# Patient Record
Sex: FEMALE | Race: WHITE | NOT HISPANIC OR LATINO
[De-identification: names, ages, dates, MRNs, and addresses within clinical notes are randomized per-mention and may not be internally consistent; named-entity substitution may affect disease eponyms.]

---

## 2023-12-20 PROBLEM — Z00.00 ENCOUNTER FOR PREVENTIVE HEALTH EXAMINATION: Status: ACTIVE | Noted: 2023-12-20

## 2023-12-22 ENCOUNTER — APPOINTMENT (OUTPATIENT)
Dept: GYNECOLOGIC ONCOLOGY | Facility: CLINIC | Age: 43
End: 2023-12-22
Payer: COMMERCIAL

## 2023-12-22 VITALS
DIASTOLIC BLOOD PRESSURE: 83 MMHG | HEIGHT: 60 IN | SYSTOLIC BLOOD PRESSURE: 134 MMHG | WEIGHT: 218 LBS | HEART RATE: 82 BPM | BODY MASS INDEX: 42.8 KG/M2

## 2023-12-22 DIAGNOSIS — N83.209 UNSPECIFIED OVARIAN CYST, UNSPECIFIED SIDE: ICD-10-CM

## 2023-12-22 DIAGNOSIS — Z80.1 FAMILY HISTORY OF MALIGNANT NEOPLASM OF TRACHEA, BRONCHUS AND LUNG: ICD-10-CM

## 2023-12-22 DIAGNOSIS — Z87.891 PERSONAL HISTORY OF NICOTINE DEPENDENCE: ICD-10-CM

## 2023-12-22 DIAGNOSIS — Z80.0 FAMILY HISTORY OF MALIGNANT NEOPLASM OF DIGESTIVE ORGANS: ICD-10-CM

## 2023-12-22 PROCEDURE — 99205 OFFICE O/P NEW HI 60 MIN: CPT

## 2023-12-22 RX ORDER — UBROGEPANT 100 MG/1
100 TABLET ORAL
Refills: 0 | Status: ACTIVE | COMMUNITY

## 2023-12-22 RX ORDER — BACILLUS COAGULANS/INULIN 1B-250 MG
CAPSULE ORAL
Refills: 0 | Status: ACTIVE | COMMUNITY

## 2023-12-22 RX ORDER — PNV NO.95/FERROUS FUM/FOLIC AC 28MG-0.8MG
TABLET ORAL
Refills: 0 | Status: ACTIVE | COMMUNITY

## 2023-12-22 RX ORDER — DIPHENHYDRAMINE HYDROCHLORIDE, ZINC ACETATE 20; 1 MG/G; MG/G
CREAM TOPICAL
Refills: 0 | Status: ACTIVE | COMMUNITY

## 2023-12-22 RX ORDER — BIOTIN 10 MG
TABLET ORAL
Refills: 0 | Status: ACTIVE | COMMUNITY

## 2023-12-22 RX ORDER — ERENUMAB-AOOE 70 MG/ML
INJECTION SUBCUTANEOUS
Refills: 0 | Status: ACTIVE | COMMUNITY

## 2023-12-22 RX ORDER — PSYLLIUM HUSK 0.4 G
CAPSULE ORAL
Refills: 0 | Status: ACTIVE | COMMUNITY

## 2023-12-22 NOTE — PHYSICAL EXAM
[Chaperone Present] : A chaperone was present in the examining room during all aspects of the physical examination [Soft, Nontender] : the abdomen was soft and nontender [Abnormal] : Bimanual Exam: Abnormal [Normal] : Recto-Vaginal Exam: Normal [Fully active, able to carry on all pre-disease performance without restriction] : Status 0 - Fully active, able to carry on all pre-disease performance without restriction [FreeTextEntry1] : Iveth [Firm] : not firm [Guarding] : no guarding

## 2023-12-22 NOTE — DISCUSSION/SUMMARY
[Reviewed Clinical Lab Test(s)] : Results of clinical tests were reviewed. [Reviewed Radiology Report(s)] : Radiology reports were reviewed. [Reviewed Radiology Film/Image(s)] : Images from radiology studies were reviewed and examined. [Discuss Tests w/Referring Providers] : Results of labs/radiology studies and the treatment recommendations were discussed with performing/referring physician. [Discuss Alternatives/Risks/Benefits w/Patient] : All alternatives, risks, and benefits were discussed with the patient/family and all questions were answered.  Patient expressed good understanding and appreciates the importance of follow up as recommended. [Visit Time ___ Minutes] : [unfilled] minutes [Face to Face Time___ Minutes] : with [unfilled] minutes in face to face consultation. [Pre Op] : The differential diagnosis was discussed in detail. The indications, risks, benefits and alternatives were discussed. [unfilled] expressed an understanding of the treatment rationale and her questions were answered to her apparent satisfaction. [FreeTextEntry1] :  We had an extensive discussion today about management of ovarian cysts in premenopausal women. We reviewed the differential of ovarian cysts in this setting:    Benign ovarian cysts  Ovarian cysts of borderline malignant potential  Malignant ovarian cysts (ovarian cancer)  Benign non-ovarian processes (fallopian tube cysts, fibroids, scar tissue)  Malignant non-ovarian processes (adjacent organ or metastasis from another primary site)   There are several factors that impact the risk of malignancy including the size of the cyst, presence or absence of septations, solid areas, increased vascularity, surface nodularity, or ascites. Elevated tumor markers would also be of concern.   I discussed low suspicion of malignancy due to the appearance of the mass, her age and normal markers, but that a mass of this size will persist continue to cause symptoms and should be removed surgically.  Offered patient USO with frozen section and possible staging  We discussed open vs MIS approach.  I discussed possibility of draining the cyst, followed by MIS approach to removal vs laparotomy. Discussed the advantage of laparotomy being the likely removal of the mass intact without spillage which would not be possible with the MIS approach. She prefers to drain the cyst and understands the risks in the unlikely event a malignancy is present.   Once the surgical specimen is removed, it will be sent for frozen section by pathology while the patient is still asleep. If the mass is benign, no further surgery would be required. If a malignancy were identified, further intraoperative procedures would be dependent upon what is identified and the patient's tolerance of the procedure and clinical status. High grade tumors typically require complete staging including BSO, hysterectomy, omentectomy, and pelvic/para-aortic lymphadenectomy. Surgical staging is an important part of management for some cancers as it can help determine whether the patient will benefit from postoperative chemotherapy.  There are limitations associated with frozen pathology in that it only samples a very small amount of the specimen and cancer can be missed. However, it is not possible or feasible to do more sampling at the time of surgery and this is our best attempt to avoid the need for a second surgery but sometimes this is not possible.  The patient would like a hysterectomy regardless of pathology of adnexal mass. She states that she has been wanting this for a long time. Does not like IUD even though it seems to be working for her right now, she would not want another one placed in the future. We discussed she is somewhere around 5 years away from menopause which may resolve her symptoms related to the AUB and fibroids and she also expressed concern about HPV and possible cervical or uterine malignancy in the future. I discussed that this is unpredictable but that hysterectomy does add additional complexity and risk to the surgery as well as slightly increased recovery time. She still desires to proceed with hysterectomy.   We have discussed the risks of the planned procedure at length as well as the benefits and alternatives.   We discussed risk of bleeding and the possibility of blood transfusions, which the patient is amenable to in the event that it is needed.   We have discussed the possibility of infection, including superficial infection of the wound, the underlying tissues or deep infection in the abdomen or pelvis, as well as urinary tract infection and pneumonia.  We discussed the steps taken to mitigate that risk; but the possibility of need for antibiotics, a prolonged hospital stay, hospital readmission, or ICU stay remain.  We discussed the individual risk of infection is different for each patient and each procedure.   We have discussed risk of damage to surrounding structures, including the large and small bowel, bladder and ureters, blood vessels, and nerves. We discussed that we attempt to identify and fix any injury at the time of surgery, but that this may involve a more extensive operation that originally planned, the possible involvement of additional surgical subspecialists and a longer hospital stay than is typical.  We discussed that again, each individual patient and procedure incurs an unequal level of risk of possible complication during surgery.   There is an increased risk of VTE and PE related to surgery and hospitalization especially in the setting of a cancer diagnosis. We discussed administration of heparin pre-operatively as well as during hospitalization if necessary to prevent formation of blood clots.    We have discussed the post operative expectations for the surgery as planned, but deviations from the surgical plan may be required in order to achieve the surgical goals. This may necessitate additional post operative care, in-hospital or at home as determined by the situation. We discussed possible removal of abnormal appearing tissue in the abdominal cavity if it is seen and appendectomy if abnormal appearing. We discussed that with any minimally invasive or laparoscopic/robotic procedure there is a low, but not zero risk of conversion to an open surgery.   We discussed that there may be medical students, residents and fellows present during her procedure and participating in her post-operative care under my direct supervision. We discussed the importance of individuals participating in the procedure, including myself and assistants, to perform exam under anesthesia.   Plan for surgery RA ZHENG BS USO possible BSO staging  PST   Erika Christian MD, FACOG  Gynecologic Oncology

## 2023-12-22 NOTE — REVIEW OF SYSTEMS
[Negative] : Respiratory [Hematuria] : no hematuria [Vaginal Discharge] : no vaginal discharge [Dyspareunia] : no dyspareunia [Normal Sexual Function] : abnormal sexual function [Chest Pain] : no chest pain [SOB on Exertion] : no shortness of breath during exertion [Diarrhea] : no diarrhea [Nausea] : no nausea/vomitting [de-identified] : Abdominal pain [de-identified] : Back pain

## 2023-12-22 NOTE — HISTORY OF PRESENT ILLNESS
[FreeTextEntry1] : 44 yo with known large adnexal cyst.  She states that the cyst has been present for at least 6 months. States that she had new IUD placed 6/2023 and US was done which showed cyst but noone ever told her about this until she was seen in the ED earlier this month and she was told that it had not changed in size. Which prompted her to get records of the prior US report.  She reports pelvic pain that is worse with activity. She also notes positional feelings of abdominal pressure or pelvic heaviness. Also notes weight gain over 6mo-1yr time. States that she feels very bloated all of the time. Does not have bleeding regularly due to IUD. Also with sciatic back pain.   She states that before IUD she had very painful menses with very heavy bleeding that was very irregular. She also states that she has been asking for a hysterectomy for many years due to pain, fibroids and concern for prior HPV infection and development of cervical or endometrial malignancy in the future.    The patient denies CP, SOB, abdominal pain, distension, constipation, diarrhea, urinary symptoms, vaginal bleeding, discharge, extremity swelling, skin changes. She denies problems with sleep, depression or anxiety.  HCM: Pap: 4/2023? Mammo:2023 wnl per pt C-scope: 2023, polyps removed, diverticulosis PMH: Migraine  PSH: Tonsilectomy Gyn Hx: Hx of cysts and fibroids as above, Hx of AUB in past Ob Hx: G0 Meds: Amovig, Ubrelvy, MVI, Probiotic, Fiber Allergies: LATEX, swelling, PCN- adverse reaction yeast infection  FH: Grandmother, lung 68, Mother colon 6, Grandmother paternal breast 60 SH: Former smoker 10 pack years, quit ~20 years ago, no etoh, occ marijuana Works as emergency response agent  Lives mostly in [place], aunt lives here, she goes back and forth  12/9/23 : 8.5 CEA: 0.7 : 7.0  CTAP 12/9/23 Report  18.0x 13.0x 13.3cm L ovarian cyst, no solid components or septations Ut 9.ox 3.4x 6.5, 2.3cm fibroid   TVUS 12/9/23 [facility] Report Left Unilocular cyst 17.4x 14.7x 16.4cm Region of wall thickening with vascular flow ORADS 4  TVUS 6/20/23 L ovarian cyst 18.7x 15.7x 11.6 R ov cyst 3cm

## 2024-01-16 ENCOUNTER — OUTPATIENT (OUTPATIENT)
Dept: OUTPATIENT SERVICES | Facility: HOSPITAL | Age: 44
LOS: 1 days | End: 2024-01-16

## 2024-01-16 VITALS
HEART RATE: 68 BPM | DIASTOLIC BLOOD PRESSURE: 82 MMHG | OXYGEN SATURATION: 99 % | SYSTOLIC BLOOD PRESSURE: 130 MMHG | WEIGHT: 216.93 LBS | TEMPERATURE: 98 F | RESPIRATION RATE: 16 BRPM | HEIGHT: 60 IN

## 2024-01-16 DIAGNOSIS — N83.209 UNSPECIFIED OVARIAN CYST, UNSPECIFIED SIDE: ICD-10-CM

## 2024-01-16 DIAGNOSIS — Z90.89 ACQUIRED ABSENCE OF OTHER ORGANS: Chronic | ICD-10-CM

## 2024-01-16 DIAGNOSIS — E66.01 MORBID (SEVERE) OBESITY DUE TO EXCESS CALORIES: ICD-10-CM

## 2024-01-16 LAB
A1C WITH ESTIMATED AVERAGE GLUCOSE RESULT: 5.2 % — SIGNIFICANT CHANGE UP (ref 4–5.6)
ANION GAP SERPL CALC-SCNC: 9 MMOL/L — SIGNIFICANT CHANGE UP (ref 7–14)
BLD GP AB SCN SERPL QL: NEGATIVE — SIGNIFICANT CHANGE UP
BUN SERPL-MCNC: 8 MG/DL — SIGNIFICANT CHANGE UP (ref 7–23)
CALCIUM SERPL-MCNC: 8.7 MG/DL — SIGNIFICANT CHANGE UP (ref 8.4–10.5)
CHLORIDE SERPL-SCNC: 101 MMOL/L — SIGNIFICANT CHANGE UP (ref 98–107)
CO2 SERPL-SCNC: 28 MMOL/L — SIGNIFICANT CHANGE UP (ref 22–31)
CREAT SERPL-MCNC: 0.71 MG/DL — SIGNIFICANT CHANGE UP (ref 0.5–1.3)
EGFR: 107 ML/MIN/1.73M2 — SIGNIFICANT CHANGE UP
ESTIMATED AVERAGE GLUCOSE: 103 — SIGNIFICANT CHANGE UP
GLUCOSE SERPL-MCNC: 90 MG/DL — SIGNIFICANT CHANGE UP (ref 70–99)
HCG UR QL: NEGATIVE — SIGNIFICANT CHANGE UP
HCT VFR BLD CALC: 39.2 % — SIGNIFICANT CHANGE UP (ref 34.5–45)
HGB BLD-MCNC: 12.7 G/DL — SIGNIFICANT CHANGE UP (ref 11.5–15.5)
MCHC RBC-ENTMCNC: 28 PG — SIGNIFICANT CHANGE UP (ref 27–34)
MCHC RBC-ENTMCNC: 32.4 GM/DL — SIGNIFICANT CHANGE UP (ref 32–36)
MCV RBC AUTO: 86.3 FL — SIGNIFICANT CHANGE UP (ref 80–100)
NRBC # BLD: 0 /100 WBCS — SIGNIFICANT CHANGE UP (ref 0–0)
NRBC # FLD: 0 K/UL — SIGNIFICANT CHANGE UP (ref 0–0)
PLATELET # BLD AUTO: 212 K/UL — SIGNIFICANT CHANGE UP (ref 150–400)
POTASSIUM SERPL-MCNC: 3.6 MMOL/L — SIGNIFICANT CHANGE UP (ref 3.5–5.3)
POTASSIUM SERPL-SCNC: 3.6 MMOL/L — SIGNIFICANT CHANGE UP (ref 3.5–5.3)
RBC # BLD: 4.54 M/UL — SIGNIFICANT CHANGE UP (ref 3.8–5.2)
RBC # FLD: 13.3 % — SIGNIFICANT CHANGE UP (ref 10.3–14.5)
RH IG SCN BLD-IMP: POSITIVE — SIGNIFICANT CHANGE UP
RH IG SCN BLD-IMP: POSITIVE — SIGNIFICANT CHANGE UP
SODIUM SERPL-SCNC: 138 MMOL/L — SIGNIFICANT CHANGE UP (ref 135–145)
WBC # BLD: 7.25 K/UL — SIGNIFICANT CHANGE UP (ref 3.8–10.5)
WBC # FLD AUTO: 7.25 K/UL — SIGNIFICANT CHANGE UP (ref 3.8–10.5)

## 2024-01-16 RX ORDER — ERENUMAB-AOOE 70 MG/ML
140 INJECTION SUBCUTANEOUS
Refills: 0 | DISCHARGE

## 2024-01-16 RX ORDER — SODIUM CHLORIDE 9 MG/ML
1000 INJECTION, SOLUTION INTRAVENOUS
Refills: 0 | Status: DISCONTINUED | OUTPATIENT
Start: 2024-01-23 | End: 2024-01-23

## 2024-01-16 RX ORDER — CHLORHEXIDINE GLUCONATE 213 G/1000ML
1 SOLUTION TOPICAL DAILY
Refills: 0 | Status: DISCONTINUED | OUTPATIENT
Start: 2024-01-23 | End: 2024-01-24

## 2024-01-16 NOTE — H&P PST ADULT - ATTENDING COMMENTS
R/B/A discussed at length during consultation and reiterated this morning in pre-op.  Proceed with case as scheduled.  RA TLH BS Unilateral Oophorectomy, cystoscopy, possible open, possible staging if cancer with removal of contralateral ovary, lymph nodes, omentum, abnormal tissue    Erika Christian MD, FACOG  Gynecologic Oncology

## 2024-01-16 NOTE — H&P PST ADULT - NEGATIVE FEMALE-SPECIFIC SYMPTOMS
Solaraze Counseling:  I discussed with the patient the risks of Solaraze including but not limited to erythema, scaling, itching, weeping, crusting, and pain. no irregular menses

## 2024-01-16 NOTE — H&P PST ADULT - ASSESSMENT
The patient is a 65y year old Female complaining of chest pressure. 44 y/o female with hx of ovarian cyst (L) x 6 months.  Pt reports pelvic pain that is worse with activity, and menstruation   Scheduled for Robotic Assisted Hysterectomy unilateral oophorectomy 42 y/o female with hx of ovarian cyst (L) x 6 months.  Pt reports pelvic pain that is worse with activity, and menstruation   Scheduled for Robotic Assisted Hysterectomy unilateral oophorectomy, salpingectomy, possible staging

## 2024-01-16 NOTE — H&P PST ADULT - GENITOURINARY COMMENTS
hx of ovarian cyst (L) x 6 months.  Pt reports pelvic pain that is worse with activity, and menstruation   Scheduled for Robotic Assisted Hysterectomy unilateral oophorectomy not examined

## 2024-01-16 NOTE — H&P PST ADULT - HISTORY OF PRESENT ILLNESS
42 y/o female with hx of ovarian cyst (L) x 6 months.  Pt reports pelvic pain that is worse with activity, and menstruation   Scheduled for Robotic Assisted Hysterectomy unilateral oophorectomy 42 y/o female with hx of ovarian cyst (L) x 6 months.  Pt reports pelvic pain that is worse with activity, and menstruation   Scheduled for Robotic Assisted Hysterectomy unilateral oophorectomy, salpingectomy, possible staging

## 2024-01-16 NOTE — H&P PST ADULT - PROBLEM SELECTOR PLAN 1
Robotic Assisted Hysterectomy unilateral oophorectomy 1/23/24    CBC BMP UCG T&S HgbA1C ABO     Preop instructions and antibacterial soap given and explained (verbal and written), with teach back. Robotic Assisted Hysterectomy unilateral oophorectomy, salpingectomy, possible staging 1/23/24    CBC BMP UCG T&S HgbA1C ABO     Preop instructions and antibacterial soap given and explained (verbal and written), with teach back.

## 2024-01-16 NOTE — H&P PST ADULT - NSICDXPASTMEDICALHX_GEN_ALL_CORE_FT
PAST MEDICAL HISTORY:  Colon polyps     Cyst, ovarian     Diverticulosis     Obesity      PAST MEDICAL HISTORY:  Colon polyps     Cyst, ovarian     Diverticulosis     History of migraine headaches     Obesity

## 2024-01-22 ENCOUNTER — TRANSCRIPTION ENCOUNTER (OUTPATIENT)
Age: 44
End: 2024-01-22

## 2024-01-22 NOTE — ASU PATIENT PROFILE, ADULT - FALL HARM RISK - UNIVERSAL INTERVENTIONS
Bed in lowest position, wheels locked, appropriate side rails in place/Call bell, personal items and telephone in reach/Instruct patient to call for assistance before getting out of bed or chair/Non-slip footwear when patient is out of bed/[place] to call system/Physically safe environment - no spills, clutter or unnecessary equipment/Purposeful Proactive Rounding/Room/bathroom lighting operational, light cord in reach

## 2024-01-23 ENCOUNTER — INPATIENT (INPATIENT)
Facility: HOSPITAL | Age: 44
LOS: 0 days | Discharge: HOME | End: 2024-01-24
Attending: GENERAL ACUTE CARE HOSPITAL | Admitting: GENERAL ACUTE CARE HOSPITAL
Payer: COMMERCIAL

## 2024-01-23 ENCOUNTER — RESULT REVIEW (OUTPATIENT)
Age: 44
End: 2024-01-23

## 2024-01-23 ENCOUNTER — APPOINTMENT (OUTPATIENT)
Dept: GYNECOLOGIC ONCOLOGY | Facility: HOSPITAL | Age: 44
End: 2024-01-23

## 2024-01-23 VITALS
HEIGHT: 60 IN | TEMPERATURE: 98 F | HEART RATE: 83 BPM | WEIGHT: 216.93 LBS | OXYGEN SATURATION: 98 % | DIASTOLIC BLOOD PRESSURE: 94 MMHG | SYSTOLIC BLOOD PRESSURE: 131 MMHG | RESPIRATION RATE: 18 BRPM

## 2024-01-23 DIAGNOSIS — N83.209 UNSPECIFIED OVARIAN CYST, UNSPECIFIED SIDE: ICD-10-CM

## 2024-01-23 DIAGNOSIS — Z90.89 ACQUIRED ABSENCE OF OTHER ORGANS: Chronic | ICD-10-CM

## 2024-01-23 LAB
ANION GAP SERPL CALC-SCNC: 15 MMOL/L — HIGH (ref 7–14)
BUN SERPL-MCNC: 11 MG/DL — SIGNIFICANT CHANGE UP (ref 7–23)
CALCIUM SERPL-MCNC: 8.6 MG/DL — SIGNIFICANT CHANGE UP (ref 8.4–10.5)
CHLORIDE SERPL-SCNC: 99 MMOL/L — SIGNIFICANT CHANGE UP (ref 98–107)
CO2 SERPL-SCNC: 24 MMOL/L — SIGNIFICANT CHANGE UP (ref 22–31)
CREAT SERPL-MCNC: 0.69 MG/DL — SIGNIFICANT CHANGE UP (ref 0.5–1.3)
EGFR: 109 ML/MIN/1.73M2 — SIGNIFICANT CHANGE UP
GLUCOSE BLDC GLUCOMTR-MCNC: 96 MG/DL — SIGNIFICANT CHANGE UP (ref 70–99)
GLUCOSE SERPL-MCNC: 175 MG/DL — HIGH (ref 70–99)
HCG UR QL: NEGATIVE — SIGNIFICANT CHANGE UP
HCT VFR BLD CALC: 39.3 % — SIGNIFICANT CHANGE UP (ref 34.5–45)
HGB BLD-MCNC: 12.6 G/DL — SIGNIFICANT CHANGE UP (ref 11.5–15.5)
MAGNESIUM SERPL-MCNC: 1.5 MG/DL — LOW (ref 1.6–2.6)
MCHC RBC-ENTMCNC: 28.1 PG — SIGNIFICANT CHANGE UP (ref 27–34)
MCHC RBC-ENTMCNC: 32.1 GM/DL — SIGNIFICANT CHANGE UP (ref 32–36)
MCV RBC AUTO: 87.7 FL — SIGNIFICANT CHANGE UP (ref 80–100)
NRBC # BLD: 0 /100 WBCS — SIGNIFICANT CHANGE UP (ref 0–0)
NRBC # FLD: 0 K/UL — SIGNIFICANT CHANGE UP (ref 0–0)
PHOSPHATE SERPL-MCNC: 3.8 MG/DL — SIGNIFICANT CHANGE UP (ref 2.5–4.5)
PLATELET # BLD AUTO: 195 K/UL — SIGNIFICANT CHANGE UP (ref 150–400)
POTASSIUM SERPL-MCNC: 4.1 MMOL/L — SIGNIFICANT CHANGE UP (ref 3.5–5.3)
POTASSIUM SERPL-SCNC: 4.1 MMOL/L — SIGNIFICANT CHANGE UP (ref 3.5–5.3)
RBC # BLD: 4.48 M/UL — SIGNIFICANT CHANGE UP (ref 3.8–5.2)
RBC # FLD: 13.2 % — SIGNIFICANT CHANGE UP (ref 10.3–14.5)
SODIUM SERPL-SCNC: 138 MMOL/L — SIGNIFICANT CHANGE UP (ref 135–145)
WBC # BLD: 13.36 K/UL — HIGH (ref 3.8–10.5)
WBC # FLD AUTO: 13.36 K/UL — HIGH (ref 3.8–10.5)

## 2024-01-23 PROCEDURE — 58571 TLH W/T/O 250 G OR LESS: CPT

## 2024-01-23 PROCEDURE — 58925 REMOVAL OF OVARIAN CYST(S): CPT

## 2024-01-23 PROCEDURE — 88331 PATH CONSLTJ SURG 1 BLK 1SPC: CPT | Mod: 26

## 2024-01-23 PROCEDURE — 88307 TISSUE EXAM BY PATHOLOGIST: CPT | Mod: 26

## 2024-01-23 PROCEDURE — 88305 TISSUE EXAM BY PATHOLOGIST: CPT | Mod: 26

## 2024-01-23 PROCEDURE — 88305 TISSUE EXAM BY PATHOLOGIST: CPT | Mod: 26,59

## 2024-01-23 PROCEDURE — 88112 CYTOPATH CELL ENHANCE TECH: CPT | Mod: 26

## 2024-01-23 DEVICE — INTERCEED 5 X 6" XL: Type: IMPLANTABLE DEVICE | Status: FUNCTIONAL

## 2024-01-23 DEVICE — ARISTA 3GR: Type: IMPLANTABLE DEVICE | Status: FUNCTIONAL

## 2024-01-23 RX ORDER — ONDANSETRON 8 MG/1
4 TABLET, FILM COATED ORAL EVERY 6 HOURS
Refills: 0 | Status: DISCONTINUED | OUTPATIENT
Start: 2024-01-23 | End: 2024-01-24

## 2024-01-23 RX ORDER — FENTANYL CITRATE 50 UG/ML
25 INJECTION INTRAVENOUS
Refills: 0 | Status: DISCONTINUED | OUTPATIENT
Start: 2024-01-23 | End: 2024-01-23

## 2024-01-23 RX ORDER — ACETAMINOPHEN 500 MG
1000 TABLET ORAL ONCE
Refills: 0 | Status: COMPLETED | OUTPATIENT
Start: 2024-01-24 | End: 2024-01-24

## 2024-01-23 RX ORDER — ONDANSETRON 8 MG/1
4 TABLET, FILM COATED ORAL ONCE
Refills: 0 | Status: DISCONTINUED | OUTPATIENT
Start: 2024-01-23 | End: 2024-01-23

## 2024-01-23 RX ORDER — IBUPROFEN 200 MG
1 TABLET ORAL
Qty: 0 | Refills: 0 | DISCHARGE

## 2024-01-23 RX ORDER — KETOROLAC TROMETHAMINE 30 MG/ML
30 SYRINGE (ML) INJECTION EVERY 6 HOURS
Refills: 0 | Status: DISCONTINUED | OUTPATIENT
Start: 2024-01-23 | End: 2024-01-24

## 2024-01-23 RX ORDER — HYDROMORPHONE HYDROCHLORIDE 2 MG/ML
0.5 INJECTION INTRAMUSCULAR; INTRAVENOUS; SUBCUTANEOUS
Refills: 0 | Status: DISCONTINUED | OUTPATIENT
Start: 2024-01-23 | End: 2024-01-23

## 2024-01-23 RX ORDER — OXYCODONE HYDROCHLORIDE 5 MG/1
1 TABLET ORAL
Qty: 10 | Refills: 0
Start: 2024-01-23

## 2024-01-23 RX ORDER — SODIUM CHLORIDE 9 MG/ML
500 INJECTION, SOLUTION INTRAVENOUS ONCE
Refills: 0 | Status: COMPLETED | OUTPATIENT
Start: 2024-01-23 | End: 2024-01-23

## 2024-01-23 RX ORDER — ACETAMINOPHEN 500 MG
1000 TABLET ORAL ONCE
Refills: 0 | Status: COMPLETED | OUTPATIENT
Start: 2024-01-24 | End: 2024-01-23

## 2024-01-23 RX ORDER — OXYCODONE HYDROCHLORIDE 5 MG/1
5 TABLET ORAL ONCE
Refills: 0 | Status: DISCONTINUED | OUTPATIENT
Start: 2024-01-23 | End: 2024-01-23

## 2024-01-23 RX ORDER — ACETAMINOPHEN 500 MG
3 TABLET ORAL
Qty: 0 | Refills: 0 | DISCHARGE

## 2024-01-23 RX ORDER — SODIUM CHLORIDE 9 MG/ML
1000 INJECTION, SOLUTION INTRAVENOUS
Refills: 0 | Status: DISCONTINUED | OUTPATIENT
Start: 2024-01-23 | End: 2024-01-23

## 2024-01-23 RX ORDER — SODIUM CHLORIDE 9 MG/ML
1000 INJECTION, SOLUTION INTRAVENOUS
Refills: 0 | Status: DISCONTINUED | OUTPATIENT
Start: 2024-01-23 | End: 2024-01-24

## 2024-01-23 RX ORDER — DIAZEPAM 5 MG
5 TABLET ORAL ONCE
Refills: 0 | Status: DISCONTINUED | OUTPATIENT
Start: 2024-01-23 | End: 2024-01-24

## 2024-01-23 RX ORDER — HEPARIN SODIUM 5000 [USP'U]/ML
5000 INJECTION INTRAVENOUS; SUBCUTANEOUS EVERY 12 HOURS
Refills: 0 | Status: DISCONTINUED | OUTPATIENT
Start: 2024-01-23 | End: 2024-01-24

## 2024-01-23 RX ORDER — HYDROMORPHONE HYDROCHLORIDE 2 MG/ML
0.5 INJECTION INTRAMUSCULAR; INTRAVENOUS; SUBCUTANEOUS
Refills: 0 | Status: DISCONTINUED | OUTPATIENT
Start: 2024-01-23 | End: 2024-01-24

## 2024-01-23 RX ORDER — OXYCODONE HYDROCHLORIDE 5 MG/1
5 TABLET ORAL EVERY 6 HOURS
Refills: 0 | Status: DISCONTINUED | OUTPATIENT
Start: 2024-01-23 | End: 2024-01-24

## 2024-01-23 RX ADMIN — HYDROMORPHONE HYDROCHLORIDE 0.5 MILLIGRAM(S): 2 INJECTION INTRAMUSCULAR; INTRAVENOUS; SUBCUTANEOUS at 19:15

## 2024-01-23 RX ADMIN — CHLORHEXIDINE GLUCONATE 1 APPLICATION(S): 213 SOLUTION TOPICAL at 14:02

## 2024-01-23 RX ADMIN — OXYCODONE HYDROCHLORIDE 5 MILLIGRAM(S): 5 TABLET ORAL at 21:55

## 2024-01-23 RX ADMIN — SODIUM CHLORIDE 125 MILLILITER(S): 9 INJECTION, SOLUTION INTRAVENOUS at 21:54

## 2024-01-23 RX ADMIN — SODIUM CHLORIDE 1500 MILLILITER(S): 9 INJECTION, SOLUTION INTRAVENOUS at 21:54

## 2024-01-23 RX ADMIN — SODIUM CHLORIDE 30 MILLILITER(S): 9 INJECTION, SOLUTION INTRAVENOUS at 14:02

## 2024-01-23 RX ADMIN — SODIUM CHLORIDE 125 MILLILITER(S): 9 INJECTION, SOLUTION INTRAVENOUS at 23:34

## 2024-01-23 RX ADMIN — HYDROMORPHONE HYDROCHLORIDE 0.5 MILLIGRAM(S): 2 INJECTION INTRAMUSCULAR; INTRAVENOUS; SUBCUTANEOUS at 19:45

## 2024-01-23 RX ADMIN — Medication 400 MILLIGRAM(S): at 23:35

## 2024-01-23 NOTE — ASU PREOP CHECKLIST - SELECT TESTS ORDERED
BMP/Type and Screen/UCG/POCT Blood Glucose 96         @1327/BMP/Type and Screen/UCG/POCT Blood Glucose

## 2024-01-23 NOTE — BRIEF OPERATIVE NOTE - OPERATION/FINDINGS
EUA enlarged adnexal mass extending beyond the level of the umbilicus, approximately 20cm, mobile.   Laparoscopy enlarged simple-appearing 18-20cm paratubal cyst arising from the left adnexa, torsed x1 about its pedicle. Drained of >4L serous fluid. Additional left ovarian simple-appearing cyst approximately 4cm. Right fallopian tube with multiple 2-3cm paratubal cysts. Bilateral ovaries otherwise grossly normal. Uterus with 2-3cm pedunculated fundal fibroid.   Bilateral ureters seen vermiculating retroperitoneally.   Cystoscopy jets visualized from bilateral UOs

## 2024-01-23 NOTE — ASU DISCHARGE PLAN (ADULT/PEDIATRIC) - ASU DC SPECIAL INSTRUCTIONSFT
Please remove bandages over incision sites the day after your surgery. Postoperative Instructions    Please remove bandages over incision sites the day after your surgery.    For pain control, take the followin. Ibuprofen 600mg every 6 hours, take with food  2. Add Tylenol 975mg every 6 hours, alternated with ibuprofen  Tylenol and ibuprofen may be obtained over the counter.  3. Oxycodone 5mg every 6 hours as needed for severe pain. A prescription has been sent to your pharmacy.    Return to your regular way of eating.     Resume normal activity as tolerated, but no heavy lifting or strenuous activity. Complete vaginal rest, no tampons, no douching, no tub bathing, no sexual activities.      No driving while on narcotic pain medication.      Call your doctor with any signs and symptoms of infection such as fever (>100.4 F), chills, nausea or vomiting.  Call your doctor if you're unable to tolerate food or have difficulty urinating.  Call your doctor if you have pain that is not relieved by your prescribed medications. Call your doctor with redness or swelling at the incision site, fluid leakage or wound separation.    Notify your doctor with any other concerns. Follow up with your doctor in 2 weeks for a post-operative appointment.

## 2024-01-23 NOTE — ASU DISCHARGE PLAN (ADULT/PEDIATRIC) - PROCEDURE
robot assisted total laparoscopic hysterectomy, bilateral salpingectomy, left ovarian cystectomy, cystoscopy

## 2024-01-23 NOTE — ASU DISCHARGE PLAN (ADULT/PEDIATRIC) - NS MD DC FALL RISK RISK
For information on Fall & Injury Prevention, visit: [email or web address] OR  [email or web address] OR  https://www.cdc.gov/steadi/patient.html

## 2024-01-23 NOTE — ASU DISCHARGE PLAN (ADULT/PEDIATRIC) - CARE PROVIDER_API CALL
Erika Christian  Gynecologic Oncology  [address] [place] [id number]  Phone: [phone]  Fax: [phone]  Scheduled Appointment: 02/05/2024 02:00 PM

## 2024-01-23 NOTE — ASU PREOP CHECKLIST - HEIGHT IN FEET
Call and see how he is doing on 1 tablet of clonidine per day - how is hte BP?    If things are good we would go every  Other day for 2-3 weeks then stop   5

## 2024-01-23 NOTE — BRIEF OPERATIVE NOTE - NSICDXBRIEFPROCEDURE_GEN_ALL_CORE_FT
PROCEDURES:  Robot-assisted laparoscopic total hysterectomy with bilateral salpingectomy and cystoscopy using da Mera Xi 23-Jan-2024 19:06:06  Peyton Garcia  Robot-assisted left ovarian cystectomy 23-Jan-2024 19:06:18  Peyton Garcia

## 2024-01-23 NOTE — CHART NOTE - NSCHARTNOTEFT_GEN_A_CORE
Patient seen and examined at bedside, recently post-op. No acute complaints at this time. Denies CP, SOB, N/V, fevers, and chills. Pt previously had an episode of dizziness when ambulating to the bathroom. She currently denies dizziness/lightheadedness.    Vital Signs Last 24 Hours  T(C): 36.2 (01-23-24 @ 19:00), Max: 36.8 (01-23-24 @ 13:01)  HR: 92 (01-23-24 @ 21:00) (83 - 101)  BP: 131/81 (01-23-24 @ 21:00) (112/70 - 142/78)  RR: 15 (01-23-24 @ 21:00) (11 - 20)  SpO2: 98% (01-23-24 @ 21:00) (95% - 100%)    I&O's Summary    23 Jan 2024 07:01  -  23 Jan 2024 21:15  --------------------------------------------------------  IN: 300 mL / OUT: 700 mL / NET: -400 mL        Physical Exam:  General: NAD  CV: regular rate  Lungs: breathing comfortably on RA  Abdomen: Soft, appropriately tender, non-distended  Incision: robotic port site incisions are CDI, covered in op-site dressings  : no vaginal bleeding  Ext: No pain or swelling    Labs:      MEDICATIONS  (STANDING):  chlorhexidine 2% Cloths 1 Application(s) Topical daily  lactated ringers. 1000 milliLiter(s) (30 mL/Hr) IV Continuous <Continuous>  lactated ringers. 1000 milliLiter(s) (75 mL/Hr) IV Continuous <Continuous>    MEDICATIONS  (PRN):  diazepam    Tablet 5 milliGRAM(s) Oral once PRN muscle spasm, abd cramping  HYDROmorphone  Injectable 0.5 milliGRAM(s) IV Push every 10 minutes PRN Severe Pain (7 - 10)  ondansetron Injectable 4 milliGRAM(s) IV Push once PRN Nausea and/or Vomiting  oxyCODONE    IR 5 milliGRAM(s) Oral once PRN Moderate Pain (4 - 6)      42yo s/p RA TLH, BS, left ovarian cystectomy, cystoscopy recovering well in acute post-operative state.    Neuro: PO pain meds  CV: Hemodynamically stable  Pulm: Ecourage oob  GI: Regular Diet   : voiding spontaneously   Heme: SCDs for DVT PPX  ID: afebrile  Endo: no active issues  Dispo: anticipate ASU discharge    D/w Dr. Ap Day, PGY2 Patient seen and examined at bedside, recently post-op. No acute complaints at this time. Denies CP, SOB, N/V, fevers, and chills. Pt previously had an episode of dizziness when ambulating to the bathroom. She currently denies dizziness/lightheadedness. Pt asked to ambulate to the bathroom again, patient again lightheaded though less severe than prior.     Vital Signs Last 24 Hours  T(C): 36.2 (01-23-24 @ 19:00), Max: 36.8 (01-23-24 @ 13:01)  HR: 92 (01-23-24 @ 21:00) (83 - 101)  BP: 131/81 (01-23-24 @ 21:00) (112/70 - 142/78)  RR: 15 (01-23-24 @ 21:00) (11 - 20)  SpO2: 98% (01-23-24 @ 21:00) (95% - 100%)    I&O's Summary    23 Jan 2024 07:01  -  23 Jan 2024 21:15  --------------------------------------------------------  IN: 300 mL / OUT: 700 mL / NET: -400 mL        Physical Exam:  General: NAD  CV: regular rate  Lungs: breathing comfortably on RA  Abdomen: Soft, appropriately tender, non-distended  Incision: robotic port site incisions are CDI, covered in op-site dressings  : no vaginal bleeding  Ext: No pain or swelling    Labs:      MEDICATIONS  (STANDING):  chlorhexidine 2% Cloths 1 Application(s) Topical daily  lactated ringers. 1000 milliLiter(s) (30 mL/Hr) IV Continuous <Continuous>  lactated ringers. 1000 milliLiter(s) (75 mL/Hr) IV Continuous <Continuous>    MEDICATIONS  (PRN):  diazepam    Tablet 5 milliGRAM(s) Oral once PRN muscle spasm, abd cramping  HYDROmorphone  Injectable 0.5 milliGRAM(s) IV Push every 10 minutes PRN Severe Pain (7 - 10)  ondansetron Injectable 4 milliGRAM(s) IV Push once PRN Nausea and/or Vomiting  oxyCODONE    IR 5 milliGRAM(s) Oral once PRN Moderate Pain (4 - 6)      44yo s/p RA TLH, BS, left ovarian cystectomy, cystoscopy recovering well in acute post-operative state. Pt experiencing lightheadedness postoperatively.     Neuro: PO pain meds  CV: Hemodynamically stable, patient complaining of lightheadedness  - STAT CBC  Pulm: Ecourage oob  GI: Regular Diet   : voiding spontaneously   Heme: HSQ and SCDs for DVT PPX  ID: afebrile  Endo: no active issues. Continue IV fluids  - f/u STAT BMP  Dispo: admit to floor for postoperative care    D/w Dr. Ap Day, PGY2

## 2024-01-24 ENCOUNTER — TRANSCRIPTION ENCOUNTER (OUTPATIENT)
Age: 44
End: 2024-01-24

## 2024-01-24 VITALS
SYSTOLIC BLOOD PRESSURE: 132 MMHG | HEART RATE: 92 BPM | RESPIRATION RATE: 18 BRPM | OXYGEN SATURATION: 100 % | DIASTOLIC BLOOD PRESSURE: 84 MMHG | TEMPERATURE: 99 F

## 2024-01-24 LAB
ANION GAP SERPL CALC-SCNC: 11 MMOL/L — SIGNIFICANT CHANGE UP (ref 7–14)
BASOPHILS # BLD AUTO: 0.01 K/UL — SIGNIFICANT CHANGE UP (ref 0–0.2)
BASOPHILS NFR BLD AUTO: 0.1 % — SIGNIFICANT CHANGE UP (ref 0–2)
BUN SERPL-MCNC: 10 MG/DL — SIGNIFICANT CHANGE UP (ref 7–23)
CALCIUM SERPL-MCNC: 8.2 MG/DL — LOW (ref 8.4–10.5)
CHLORIDE SERPL-SCNC: 96 MMOL/L — LOW (ref 98–107)
CO2 SERPL-SCNC: 26 MMOL/L — SIGNIFICANT CHANGE UP (ref 22–31)
CREAT SERPL-MCNC: 0.65 MG/DL — SIGNIFICANT CHANGE UP (ref 0.5–1.3)
EGFR: 111 ML/MIN/1.73M2 — SIGNIFICANT CHANGE UP
EOSINOPHIL # BLD AUTO: 0 K/UL — SIGNIFICANT CHANGE UP (ref 0–0.5)
EOSINOPHIL NFR BLD AUTO: 0 % — SIGNIFICANT CHANGE UP (ref 0–6)
GLUCOSE SERPL-MCNC: 138 MG/DL — HIGH (ref 70–99)
HCT VFR BLD CALC: 34 % — LOW (ref 34.5–45)
HGB BLD-MCNC: 11.6 G/DL — SIGNIFICANT CHANGE UP (ref 11.5–15.5)
IANC: 11.35 K/UL — HIGH (ref 1.8–7.4)
IMM GRANULOCYTES NFR BLD AUTO: 0.6 % — SIGNIFICANT CHANGE UP (ref 0–0.9)
LYMPHOCYTES # BLD AUTO: 0.72 K/UL — LOW (ref 1–3.3)
LYMPHOCYTES # BLD AUTO: 5.7 % — LOW (ref 13–44)
MAGNESIUM SERPL-MCNC: 1.4 MG/DL — LOW (ref 1.6–2.6)
MCHC RBC-ENTMCNC: 28.5 PG — SIGNIFICANT CHANGE UP (ref 27–34)
MCHC RBC-ENTMCNC: 34.1 GM/DL — SIGNIFICANT CHANGE UP (ref 32–36)
MCV RBC AUTO: 83.5 FL — SIGNIFICANT CHANGE UP (ref 80–100)
MONOCYTES # BLD AUTO: 0.53 K/UL — SIGNIFICANT CHANGE UP (ref 0–0.9)
MONOCYTES NFR BLD AUTO: 4.2 % — SIGNIFICANT CHANGE UP (ref 2–14)
NEUTROPHILS # BLD AUTO: 11.35 K/UL — HIGH (ref 1.8–7.4)
NEUTROPHILS NFR BLD AUTO: 89.4 % — HIGH (ref 43–77)
NRBC # BLD: 0 /100 WBCS — SIGNIFICANT CHANGE UP (ref 0–0)
NRBC # FLD: 0 K/UL — SIGNIFICANT CHANGE UP (ref 0–0)
PHOSPHATE SERPL-MCNC: 3.1 MG/DL — SIGNIFICANT CHANGE UP (ref 2.5–4.5)
PLATELET # BLD AUTO: 200 K/UL — SIGNIFICANT CHANGE UP (ref 150–400)
POTASSIUM SERPL-MCNC: 4.4 MMOL/L — SIGNIFICANT CHANGE UP (ref 3.5–5.3)
POTASSIUM SERPL-SCNC: 4.4 MMOL/L — SIGNIFICANT CHANGE UP (ref 3.5–5.3)
RBC # BLD: 4.07 M/UL — SIGNIFICANT CHANGE UP (ref 3.8–5.2)
RBC # FLD: 13.1 % — SIGNIFICANT CHANGE UP (ref 10.3–14.5)
SODIUM SERPL-SCNC: 133 MMOL/L — LOW (ref 135–145)
WBC # BLD: 12.69 K/UL — HIGH (ref 3.8–10.5)
WBC # FLD AUTO: 12.69 K/UL — HIGH (ref 3.8–10.5)

## 2024-01-24 RX ORDER — ACETAMINOPHEN 500 MG
650 TABLET ORAL EVERY 6 HOURS
Refills: 0 | Status: DISCONTINUED | OUTPATIENT
Start: 2024-01-24 | End: 2024-01-24

## 2024-01-24 RX ORDER — MAGNESIUM OXIDE 400 MG ORAL TABLET 241.3 MG
400 TABLET ORAL ONCE
Refills: 0 | Status: COMPLETED | OUTPATIENT
Start: 2024-01-24 | End: 2024-01-24

## 2024-01-24 RX ORDER — SODIUM CHLORIDE 9 MG/ML
3 INJECTION INTRAMUSCULAR; INTRAVENOUS; SUBCUTANEOUS EVERY 8 HOURS
Refills: 0 | Status: DISCONTINUED | OUTPATIENT
Start: 2024-01-24 | End: 2024-01-24

## 2024-01-24 RX ORDER — IBUPROFEN 200 MG
600 TABLET ORAL EVERY 6 HOURS
Refills: 0 | Status: DISCONTINUED | OUTPATIENT
Start: 2024-01-24 | End: 2024-01-24

## 2024-01-24 RX ORDER — CALCIUM CARBONATE 500(1250)
1 TABLET ORAL ONCE
Refills: 0 | Status: COMPLETED | OUTPATIENT
Start: 2024-01-24 | End: 2024-01-24

## 2024-01-24 RX ORDER — TRAMADOL HYDROCHLORIDE 50 MG/1
1 TABLET ORAL
Qty: 10 | Refills: 0
Start: 2024-01-24

## 2024-01-24 RX ORDER — TRAMADOL HYDROCHLORIDE 50 MG/1
50 TABLET ORAL EVERY 6 HOURS
Refills: 0 | Status: DISCONTINUED | OUTPATIENT
Start: 2024-01-24 | End: 2024-01-24

## 2024-01-24 RX ADMIN — Medication 30 MILLIGRAM(S): at 01:32

## 2024-01-24 RX ADMIN — MAGNESIUM OXIDE 400 MG ORAL TABLET 400 MILLIGRAM(S): 241.3 TABLET ORAL at 14:43

## 2024-01-24 RX ADMIN — OXYCODONE HYDROCHLORIDE 5 MILLIGRAM(S): 5 TABLET ORAL at 04:56

## 2024-01-24 RX ADMIN — Medication 1 TABLET(S): at 14:44

## 2024-01-24 RX ADMIN — ONDANSETRON 4 MILLIGRAM(S): 8 TABLET, FILM COATED ORAL at 01:32

## 2024-01-24 RX ADMIN — Medication 30 MILLIGRAM(S): at 02:32

## 2024-01-24 RX ADMIN — OXYCODONE HYDROCHLORIDE 5 MILLIGRAM(S): 5 TABLET ORAL at 03:57

## 2024-01-24 RX ADMIN — HEPARIN SODIUM 5000 UNIT(S): 5000 INJECTION INTRAVENOUS; SUBCUTANEOUS at 05:42

## 2024-01-24 RX ADMIN — Medication 1000 MILLIGRAM(S): at 06:42

## 2024-01-24 RX ADMIN — Medication 600 MILLIGRAM(S): at 14:43

## 2024-01-24 RX ADMIN — Medication 400 MILLIGRAM(S): at 05:42

## 2024-01-24 RX ADMIN — Medication 650 MILLIGRAM(S): at 14:43

## 2024-01-24 RX ADMIN — ONDANSETRON 4 MILLIGRAM(S): 8 TABLET, FILM COATED ORAL at 08:22

## 2024-01-24 RX ADMIN — Medication 1000 MILLIGRAM(S): at 00:35

## 2024-01-24 NOTE — DISCHARGE NOTE NURSING/CASE MANAGEMENT/SOCIAL WORK - NSDCPEFALRISK_GEN_ALL_CORE
For information on Fall & Injury Prevention, visit: [email or web address] OR  [email or web address] OR  https://www.cdc.gov/steadi/patient.html

## 2024-01-24 NOTE — PROGRESS NOTE ADULT - ATTENDING COMMENTS
I have examined the patient and discussed the treatment plan with the patient as well as fellows and residents involved in the ongoing care.  We have reviewed pertinent clinical findings and I agree with the findings and plan detailed above with the following addendum.    POD#1 s/p RA TLH BS L ovarian cystectomy and excision of pelvic mass frozen benign   Persistent dizziness and Nausea/Emesis overnight   Vitals and labs reassuring  Pain well controlled   Suspect anesthetic related N/V  Advance diet today to see how she tolerates diet  Increase OOB   If able to tolerate diet consider d/c home     Erika Christian MD, FACOG  Gynecologic Oncology

## 2024-01-24 NOTE — DISCHARGE NOTE NURSING/CASE MANAGEMENT/SOCIAL WORK - PATIENT PORTAL LINK FT
You can access the FollowMyHealth Patient Portal offered by [facility] by registering at the following website: [email or web address]. By joining [facility]’s FollowMyHealth portal, you will also be able to view your health information using other applications (apps) compatible with our system.

## 2024-01-24 NOTE — DISCHARGE NOTE PROVIDER - HOSPITAL COURSE
Patient underwent an uncomplicated robot assisted total laparoscopic hysterectomy, bilateral salpingectomy, left ovarian cystectomy, cystoscopy for ovarian mass. EBL:  50cc. Hct:  39.2.  Johnson d/jake immediately after completion of surgery. POD#0 patient had two episodes of emesis and stayed overnight. Pain well controlled with IV pain medications. Pt voided spontaneously.  POD#1 patient was advanced to a regular diet. Patient was transitioned to oral analgesics and pain was well controlled.     Upon discharge on POD#1, the patient is ambulating, voiding spontaneously, tolerating oral intake, pain was well controlled with oral medication, and vital signs were stable. Patient to have close follow up with Dr. Christian.

## 2024-01-24 NOTE — DISCHARGE NOTE PROVIDER - NSDCCPTREATMENT_GEN_ALL_CORE_FT
PRINCIPAL PROCEDURE  Procedure: Robot-assisted laparoscopic total hysterectomy with bilateral salpingectomy and cystoscopy using da Mera Xi  Findings and Treatment:       SECONDARY PROCEDURE  Procedure: Robot-assisted left ovarian cystectomy  Findings and Treatment:

## 2024-01-24 NOTE — DISCHARGE NOTE PROVIDER - NSDCFUADDINST_GEN_ALL_CORE_FT
Postoperative Instructions    For pain control, take the followin. Ibuprofen 600mg every 6 hours, take with food  2. Add Tylenol 975mg every 6 hours, alternated with ibuprofen  Tylenol and ibuprofen may be obtained over the counter.  3. Oxycodone 5mg every 6 hours as needed for severe pain. A prescription has been sent to your pharmacy.    Return to your regular way of eating.     Resume normal activity as tolerated, but no heavy lifting or strenuous activity until medically cleared by your doctor. Complete vaginal rest, no tampons, no douching, no tub bathing, no sexual activities until medically cleared by your doctor.     No driving while on narcotic pain medication.      Call your doctor with any signs and symptoms of infection such as fever (>100.4 F), chills, nausea or vomiting.  Call your doctor if you're unable to tolerate food or have difficulty urinating.  Call your doctor if you have pain that is not relieved by your prescribed medications. Call your doctor with redness or swelling at the incision site, fluid leakage or wound separation.    Notify your doctor with any other concerns. Follow up with your doctor in 2 weeks for a post-operative appointment. Postoperative Instructions    For pain control, take the followin. Ibuprofen 600mg every 6 hours, take with food  2. Add Tylenol 975mg every 6 hours, alternated with ibuprofen  Tylenol and ibuprofen may be obtained over the counter.  3. Tramadol 50mg every 6 hours as needed for severe pain. A prescription has been sent to your pharmacy.    Return to your regular way of eating.     Resume normal activity as tolerated, but no heavy lifting or strenuous activity until medically cleared by your doctor. Complete vaginal rest, no tampons, no douching, no tub bathing, no sexual activities until medically cleared by your doctor.     No driving while on narcotic pain medication.      Call your doctor with any signs and symptoms of infection such as fever (>100.4 F), chills, nausea or vomiting.  Call your doctor if you're unable to tolerate food or have difficulty urinating.  Call your doctor if you have pain that is not relieved by your prescribed medications. Call your doctor with redness or swelling at the incision site, fluid leakage or wound separation.    Notify your doctor with any other concerns. Follow up with your doctor in 2 weeks for a post-operative appointment.

## 2024-01-24 NOTE — DISCHARGE NOTE PROVIDER - NSDCMRMEDTOKEN_GEN_ALL_CORE_FT
acetaminophen 325 mg oral tablet: 3 tab(s) orally every 6 hours as needed for  moderate pain  Aimovig SureClick Autoinjector 140 mg/mL subcutaneous solution: 140 milligram(s) subcutaneously once a month  ibuprofen 600 mg oral tablet: 1 tab(s) orally every 6 hours as needed for  moderate pain  oxyCODONE 5 mg oral tablet: 1 tab(s) orally every 6 hours as needed for  severe pain MDD: 4   acetaminophen 325 mg oral tablet: 3 tab(s) orally every 6 hours as needed for  moderate pain  Aimovig SureClick Autoinjector 140 mg/mL subcutaneous solution: 140 milligram(s) subcutaneously once a month  ibuprofen 600 mg oral tablet: 1 tab(s) orally every 6 hours as needed for  moderate pain  traMADol 50 mg oral tablet: 1 tab(s) orally every 6 hours as needed for  severe pain MDD: 4

## 2024-01-24 NOTE — DISCHARGE NOTE PROVIDER - CARE PROVIDER_API CALL
Erika Christian  Gynecologic Oncology  [address] [place] [id number]  Phone: [phone]  Fax: [phone]  Scheduled Appointment: 02/05/2024 02:00 PM

## 2024-01-24 NOTE — PATIENT PROFILE ADULT - FALL HARM RISK - HARM RISK INTERVENTIONS

## 2024-01-24 NOTE — PROGRESS NOTE ADULT - SUBJECTIVE AND OBJECTIVE BOX
Pt seen and examined at bedside. Pt vomited two times yesterday unprompted (300cc total), but denies nausea currently. Pain well controlled. Patient ambulating to and from bathroom. Voiding spontaneously. Burping but not yet passing flatus. Tolerating light PO diet consisting of crackers. Was dizzy yesterday but denies dizziness currently.   Pt denies fever, chills, chest pain, SOB, lightheadedness.     Objective:  T(F): 98.9 (01-24-24 @ 05:42), Max: 98.9 (01-24-24 @ 05:42)  HR: 95 (01-24-24 @ 05:42) (83 - 101)  BP: 146/82 (01-24-24 @ 05:42) (112/70 - 146/82)  RR: 16 (01-24-24 @ 05:42) (11 - 20)  SpO2: 97% (01-24-24 @ 05:42) (95% - 100%)  Wt(kg): --  I&O's Summary    23 Jan 2024 07:01  -  24 Jan 2024 06:52  --------------------------------------------------------  IN: 1165 mL / OUT: 2000 mL / NET: -835 mL      CAPILLARY BLOOD GLUCOSE      POCT Blood Glucose.: 96 mg/dL (23 Jan 2024 13:27)      MEDICATIONS  (STANDING):  heparin   Injectable 5000 Unit(s) SubCutaneous every 12 hours  sodium chloride 0.9% lock flush 3 milliLiter(s) IV Push every 8 hours    MEDICATIONS  (PRN):  ketorolac   Injectable 30 milliGRAM(s) IV Push every 6 hours PRN Severe Pain (7 - 10)  ondansetron Injectable 4 milliGRAM(s) IV Push every 6 hours PRN Nausea and/or Vomiting  oxyCODONE    IR 5 milliGRAM(s) Oral every 6 hours PRN Severe Pain (7 - 10)      Physical Exam:  Constitutional: NAD, A+O x3  CV: RR S1S2 no m/r/g  Lungs: CTA b/l, good air flow b/l  Abdomen: soft, distended, appropriately-tender. no guarding, no rebound, normal bowel sounds  Incision: port sites with steris overlaying- clean, dry, intact  Extremities: no lower extremity edema or calf tenderness bilaterally; venodynes in place    LABS:               11.6   12.69 )-----------( 200      ( 01-24 @ 06:02 )             34.0                12.6   13.36 )-----------( 195      ( 01-23 @ 21:49 )             39.3       01-23    138    |  99     |  11     ----------------------------<  175<H>  4.1     |  24     |  0.69     Ca    8.6        23 Jan 2024 21:49  Phos  3.8       01-23  Mg     1.50      01-23            Urinalysis Basic - ( 23 Jan 2024 21:49 )    Color: x / Appearance: x / SG: x / pH: x  Gluc: 175 mg/dL / Ketone: x  / Bili: x / Urobili: x   Blood: x / Protein: x / Nitrite: x   Leuk Esterase: x / RBC: x / WBC x   Sq Epi: x / Non Sq Epi: x / Bacteria: x

## 2024-01-24 NOTE — DISCHARGE NOTE NURSING/CASE MANAGEMENT/SOCIAL WORK - NSDCPECAREGIVERED_GEN_ALL_CORE
pt ambulating, eating, voiding without difficulty. iv x2 discontinued. no distress noted. scope sites on abdomen intact and dry without sign of infection./No

## 2024-01-24 NOTE — PROGRESS NOTE ADULT - ASSESSMENT
Assessment/Plan: 43y POD#1 s/p RA TLH, BS, L ovarian cystectomy, and cystoscopy. Pt in stable condition.    Neuro: On IV pain medication, will transition to PO  CV: Hemodynamically stable, f/u AM CBC  Pulm: Saturating well on RA. Increase incentive spirometry.  GI: Advance diet as tolerated  : Voiding spontaneously. Adequate UOP  Heme: Continue HSQ/Venodynes for DVT ppx. Increase OOB.    ID: Afebrile  Endo: Replete electrolytes PRN, F/u AM BMP/Mg/Phos  Dispo: continue inpatient care, poss d/c today    Pt seen with GYN Oncology team  Jamie Bojorquez PGY1   Assessment/Plan: 43y POD#1 s/p RA TLH, BS, L ovarian cystectomy, and cystoscopy. Pt in stable condition.    Neuro: On IV pain medication, will transition to PO  CV: Hemodynamically stable, AM H/H stable from previous  Pulm: Saturating well on RA. Increase incentive spirometry.  GI: Advance diet as tolerated  : Voiding spontaneously. Adequate UOP  Heme: Continue HSQ/Venodynes for DVT ppx. Increase OOB.    ID: Afebrile  Endo: Replete electrolytes PRN, F/u AM BMP/Mg/Phos  Dispo: continue inpatient care, poss d/c today    Pt seen with GYN Oncology team  Jamie Bojorquez PGY1   Assessment/Plan: 43y POD#1 s/p RA TLH, BS, L ovarian cystectomy, and cystoscopy. Pt in stable condition.    Neuro: On IV pain medication, will transition to PO  CV: Hemodynamically stable, dizziness overnight resolved, AM H/H stable from previous  Pulm: Saturating well on RA. Increase incentive spirometry.  GI: Emesis (300cc total) overnight, nausea resolved, advance diet as tolerated  : Voiding spontaneously. Adequate UOP  Heme: Continue HSQ/Venodynes for DVT ppx. Increase OOB.    ID: Afebrile  Endo: Replete electrolytes PRN, F/u AM BMP/Mg/Phos  Dispo: continue inpatient care, poss d/c today    Pt seen with GYN Oncology team  Jamie Bojorquez PGY1

## 2024-01-26 LAB — NON-GYNECOLOGICAL CYTOLOGY STUDY: SIGNIFICANT CHANGE UP

## 2024-01-31 LAB — SURGICAL PATHOLOGY STUDY: SIGNIFICANT CHANGE UP

## 2024-02-05 ENCOUNTER — APPOINTMENT (OUTPATIENT)
Dept: GYNECOLOGIC ONCOLOGY | Facility: CLINIC | Age: 44
End: 2024-02-05
Payer: COMMERCIAL

## 2024-02-05 VITALS
DIASTOLIC BLOOD PRESSURE: 76 MMHG | BODY MASS INDEX: 42.8 KG/M2 | HEART RATE: 82 BPM | WEIGHT: 218 LBS | SYSTOLIC BLOOD PRESSURE: 128 MMHG | HEIGHT: 60 IN | TEMPERATURE: 97.7 F

## 2024-02-05 DIAGNOSIS — R10.2 PELVIC AND PERINEAL PAIN: ICD-10-CM

## 2024-02-05 PROCEDURE — 99024 POSTOP FOLLOW-UP VISIT: CPT

## 2024-02-05 NOTE — REASON FOR VISIT
[Post Op] : post op visit [de-identified] : 1/23/24 [de-identified] : RA TLH drainage and excision of pelvic mass, L ovarian cystectomy

## 2024-02-05 NOTE — DISCUSSION/SUMMARY
[Clean] : was clean [Dry] : was dry [Intact] : was intact [Doing Well] : is doing well [Excellent Pain Control] : has excellent pain control [No Sign of Infection] : is showing no signs of infection [0] : 0 [Reviewed] : reviewed [Firm] : soft [Tender] : nontender [Rebound] : no rebound tenderness [Guarding] : no guarding [TextEntry] : Pathology 1/23/24 Specimen(s) Submitted 1-Left Fallopian tube cyst. 2- Right fallopian tube \T\ cyst 3- Uterus \T\ cervix 4- Left ovarian cyst  Final Diagnosis 1. Fallopian tube, left, cyst, excision  - Serous cystadenoma with focal epithelial proliferation - Unremarkable fimbriated fallopian tube with complete cross section  2. Fallopian tube, right, cyst, excision - Para-tubal cysts - Unremarkable fimbriated fallopian tube with complete cross section  3. Uterus and cervix, total hysterectomy: - Endometrial polyp - Inactive endometrium with extensive stromal decidualization, consistent with exogenous hormonal effect - Leiomyomata - Unremarkable cervix  4. Ovary, left, cyst, excision: - Follicular cyst, ovary Verified by: Karen Ramirez MD

## 2024-02-05 NOTE — ASSESSMENT
[FreeTextEntry1] : 44 yo s/p TLH BS, paratubal cystectomy, L ovarian cystectomy   Benign pathological findings.   Discussed restrictions on strenuous exercise.   RTC 4-6 weeks for cuff check  Erika Christian MD, FACOG  Gynecologic Oncology

## 2024-02-23 PROBLEM — Z86.69 PERSONAL HISTORY OF OTHER DISEASES OF THE NERVOUS SYSTEM AND SENSE ORGANS: Chronic | Status: ACTIVE | Noted: 2024-01-16

## 2024-02-23 PROBLEM — N83.209 UNSPECIFIED OVARIAN CYST, UNSPECIFIED SIDE: Chronic | Status: ACTIVE | Noted: 2024-01-16

## 2024-02-23 PROBLEM — K63.5 POLYP OF COLON: Chronic | Status: ACTIVE | Noted: 2024-01-16

## 2024-02-23 PROBLEM — E66.9 OBESITY, UNSPECIFIED: Chronic | Status: ACTIVE | Noted: 2024-01-16

## 2024-02-23 PROBLEM — K57.90 DIVERTICULOSIS OF INTESTINE, PART UNSPECIFIED, WITHOUT PERFORATION OR ABSCESS WITHOUT BLEEDING: Chronic | Status: ACTIVE | Noted: 2024-01-16

## 2024-03-22 ENCOUNTER — APPOINTMENT (OUTPATIENT)
Dept: GYNECOLOGIC ONCOLOGY | Facility: CLINIC | Age: 44
End: 2024-03-22
Payer: COMMERCIAL

## 2024-03-22 VITALS — SYSTOLIC BLOOD PRESSURE: 136 MMHG | DIASTOLIC BLOOD PRESSURE: 87 MMHG | TEMPERATURE: 97.6 F | HEART RATE: 77 BPM

## 2024-03-22 DIAGNOSIS — N94.89 OTHER SPECIFIED CONDITIONS ASSOCIATED WITH FEMALE GENITAL ORGANS AND MENSTRUAL CYCLE: ICD-10-CM

## 2024-03-22 PROCEDURE — 99024 POSTOP FOLLOW-UP VISIT: CPT

## 2024-03-22 NOTE — DISCUSSION/SUMMARY
[Clean] : was clean [Dry] : was dry [Intact] : was intact [None] : had no drainage [Normal Skin] : normal appearance [Doing Well] : is doing well [Erythema] : was not erythematous [Ecchymosis] : was not ecchymotic [Firm] : soft [Tender] : nontender [Rebound] : no rebound tenderness [Guarding] : no guarding [Vaginal Exam Abnormal] : normal vaginal exam [de-identified] : cuff intact [de-identified] : Return to normal activities as tolerated

## 2024-03-22 NOTE — ASSESSMENT
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

## 2024-03-22 NOTE — REASON FOR VISIT
[Post Op] : post op visit [de-identified] : 1/23/24 [de-identified] :  RA TLH drainage and excision of pelvic mass, L ovarian cystectomy

## (undated) DEVICE — SUT POLYSORB 4-0 P-12 UNDYED

## (undated) DEVICE — XI DRAPE COLUMN

## (undated) DEVICE — XI ARM SCISSOR MONO CURVED

## (undated) DEVICE — ENDOCATCH 10MM SPECIMEN POUCH

## (undated) DEVICE — DRAPE 3/4 SHEET 52X76"

## (undated) DEVICE — PACK D&C

## (undated) DEVICE — POSITIONER PINK PAD PIGAZZI SYSTEM

## (undated) DEVICE — XI ARM FORCEP FENESTRATED BIPOLAR 8MM

## (undated) DEVICE — ELCTR BOVIE PENCIL SMOKE EVACUATION

## (undated) DEVICE — GLV 6.5 PROTEXIS W HYDROGEL

## (undated) DEVICE — TROCAR SURGIQUEST AIRSEAL 5MM X 100MM

## (undated) DEVICE — XI ARM FORCEP PROGRASP 8MM

## (undated) DEVICE — XI OBTURATOR OPTICAL BLADELESS 8MM

## (undated) DEVICE — UTERINE MANIPULATOR CONMED VCARE MED 34MM

## (undated) DEVICE — XI DRAPE ARM

## (undated) DEVICE — TUBING AIRSEAL TRI-LUMEN FILTERED

## (undated) DEVICE — GOWN XXXL

## (undated) DEVICE — POSITIONER PURPLE ARM ONE STEP (LARGE)

## (undated) DEVICE — UTERINE MANIPULATOR CONMED VCARE SM 32MM

## (undated) DEVICE — PACK PERI GYN

## (undated) DEVICE — PREP CHLOROHEXIDINE 4% 118CC KIT

## (undated) DEVICE — SOL IRR POUR H2O 250ML

## (undated) DEVICE — XI ARM NEEDLE DRIVER SUTURECUT MEGA 8MM

## (undated) DEVICE — XI SEAL UNIVERSIAL 5-12MM

## (undated) DEVICE — FOLEY TRAY 16FR 5CC LF UMETER CLOSED

## (undated) DEVICE — DRSG MASTISOL

## (undated) DEVICE — VENODYNE/SCD SLEEVE CALF MEDIUM

## (undated) DEVICE — SOL IRR POUR NS 0.9% 500ML

## (undated) DEVICE — LUBRICANT INST ELECTROLUBE Z SOLUTION

## (undated) DEVICE — TUBING STRYKEFLOW II SUCTION / IRRIGATOR

## (undated) DEVICE — DRAPE INSTRUMENT POUCH 6.75" X 11"

## (undated) DEVICE — PACK ROBOTIC LIJ

## (undated) DEVICE — TROCAR GELPOINT MINI ADVANCED

## (undated) DEVICE — UTERINE MANIPULATOR CONMED VCARE LG 37MM

## (undated) DEVICE — XI TIP COVER

## (undated) DEVICE — SUT VICRYL 0 27" UR-6

## (undated) DEVICE — LUBRICATING JELLY ONESHOT 1.25OZ

## (undated) DEVICE — WARMING BLANKET UPPER ADULT

## (undated) DEVICE — POSITIONER FOAM HEAD CRADLE (PINK)

## (undated) DEVICE — D HELP - CLEARVIEW CLEARIFY SYSTEM

## (undated) DEVICE — APPLICATOR FOR ARISTA XL 38CM